# Patient Record
Sex: FEMALE | Race: ASIAN | NOT HISPANIC OR LATINO | URBAN - METROPOLITAN AREA
[De-identification: names, ages, dates, MRNs, and addresses within clinical notes are randomized per-mention and may not be internally consistent; named-entity substitution may affect disease eponyms.]

---

## 2022-09-22 ENCOUNTER — EMERGENCY (EMERGENCY)
Facility: HOSPITAL | Age: 57
LOS: 1 days | Discharge: HOME | End: 2022-09-22
Attending: EMERGENCY MEDICINE
Payer: MEDICAID

## 2022-09-22 VITALS
HEIGHT: 61 IN | OXYGEN SATURATION: 100 % | SYSTOLIC BLOOD PRESSURE: 100 MMHG | WEIGHT: 100.09 LBS | HEART RATE: 98 BPM | TEMPERATURE: 99 F | DIASTOLIC BLOOD PRESSURE: 63 MMHG | RESPIRATION RATE: 16 BRPM

## 2022-09-22 VITALS
SYSTOLIC BLOOD PRESSURE: 122 MMHG | RESPIRATION RATE: 16 BRPM | HEART RATE: 93 BPM | OXYGEN SATURATION: 100 % | TEMPERATURE: 99 F | DIASTOLIC BLOOD PRESSURE: 87 MMHG

## 2022-09-22 PROCEDURE — 99283 EMERGENCY DEPT VISIT LOW MDM: CPT

## 2022-09-22 PROCEDURE — 82962 GLUCOSE BLOOD TEST: CPT

## 2022-09-22 PROCEDURE — 99282 EMERGENCY DEPT VISIT SF MDM: CPT

## 2022-09-22 NOTE — ED PROVIDER NOTE - PATIENT PORTAL LINK FT
You can access the FollowMyHealth Patient Portal offered by [facility] by registering at the following website: [email or web address]. By joining "[employer]’s FollowMyHealth portal, you will also be able to view your health information using other applications (apps) compatible with our system.

## 2022-09-22 NOTE — ED PROVIDER NOTE - NSFOLLOWUPINSTRUCTIONS_ED_ALL_ED_FT
You presented to the emergency room today with joint pain and abdominal pain. 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 You should have close follow up with your rheumatologist and gastroenterologist about the worsening of your symptoms. If you have chest pain, shortness of breath, loss of consciousness, please return back to the emergency department.

## 2022-09-22 NOTE — ED PROVIDER NOTE - NS ED ROS FT
CONST: no fevers, no chills, no trauma  EYES: no blurry vision   ENT: no sore throat  CV: no chest pain, no extremity swelling  RESP: no shortness of breath  ABD: (+) abdominal pain, (+) nausea, (+) vomiting, no diarrhea, no melena  : no dysuria, no hematuria, no frequency  MSK: no back pain, no neck pain, no extremity pain  NEURO: no headache, no focal weakness, no dizziness  HEME: no bruising  SKIN: no rash

## 2022-09-22 NOTE — ED ADULT NURSE NOTE - OBJECTIVE STATEMENT
56 year old female pt presented to the ED co joint pain x 6 months, pt states joint pain to wrists, elbow, fingers , no deformity noted pt hamm x4 , pt is ambulatory denies any numbness or tingling, pt with equal strength in all extremities , pt followed by a rheumatologist and on medication, 56 year old female pt presented to the ED co joint pain x 6 months, pt states joint pain to wrists, elbow, fingers , no deformity noted pt hamm x4 , pt is ambulatory denies any numbness or tingling, pt with equal strength in all extremities , pt followed by a rheumatologist and on medication, pt states she has lost about 20lbs in a month with dec po nausea and vomiting, denies any fever no chills abd soft tender to right side

## 2022-09-22 NOTE — ED PROVIDER NOTE - ATTENDING CONTRIBUTION TO CARE
polyarthralgias w known rheum conditions under treatment by rheum  no redness to any joints that are uncomfortable  advise pt need to make more timely and frequent visits w specialist to treat conditions

## 2022-09-22 NOTE — ED PROVIDER NOTE - CLINICAL SUMMARY MEDICAL DECISION MAKING FREE TEXT BOX
56y Female PMhx DM, spondylarthritis, and colitis, presenting with joint pain that has been worsening of the past few month. Patient symptoms consistent with her chronic conditions. Patient has upcoming appointments with her rheumatologist and gastroenterologist on 10/6. Informed patient about calling her doctors about the possibility of moving the appointments earlier.

## 2022-09-22 NOTE — ED PROVIDER NOTE - OBJECTIVE STATEMENT
56y Female PMhx DM, spondylarthritis, and colitis, presenting with joint pain that has been worsening of the past few month. Patient states that she has aching joint pain in the bilateral MCPs, wrists, and neck. Patient was placed on sulfasalazine and prednisone 5mg by her rheumatologist 5 months ago, which limited improvement of her symptoms. Her rheumatologist has told the patient that they are trying to change medications, but need to clear it with insurance. She states that she will be seeing her rheumatologist on 10/6 for follow-up.    In addition, patient is endorsing chronic abdominal pain for the past month, with nausea/vomiting and constipation. Patient has chronic colitis. patient is followed by a gastroenterologist that she saw 5 months ago. She also has an appointment to see her gastroenterologist on 10/6. Due to her decreased PO intake, patient has been losing weight (~20 lbs in past month). Patient denies fever, CP, SOB, and urinary symptoms.

## 2022-09-22 NOTE — ED PROVIDER NOTE - PHYSICAL EXAMINATION
Const: not in acute distress  Eyes: no conjunctival injection  HEENT: Head NCAT, Moist MM.  Neck: Trachea midline.   CVS: +S1/S2, Peripheral pulses 2+ and equal in all extremities.  RESP: Unlabored respiratory effort. Clear to auscultation bilaterally.  GI: Discomfort with palpation, Nondistended, No CVA tenderness b/l.   MSK: Normocephalic/Atraumatic, No Lower Extremities edema b/l.   Skin: Intact.   Neuro: CNs II-XII grossly intact. Motor & Sensation grossly intact.  Psych: Awake, Alert, & Oriented (AAO) x3.

## 2023-01-09 PROBLEM — Z00.00 ENCOUNTER FOR PREVENTIVE HEALTH EXAMINATION: Status: ACTIVE | Noted: 2023-01-09

## 2023-01-09 PROBLEM — K52.9 NONINFECTIVE GASTROENTERITIS AND COLITIS, UNSPECIFIED: Chronic | Status: ACTIVE | Noted: 2022-09-22

## 2023-01-09 PROBLEM — M47.819 SPONDYLOSIS WITHOUT MYELOPATHY OR RADICULOPATHY, SITE UNSPECIFIED: Chronic | Status: ACTIVE | Noted: 2022-09-22

## 2023-01-13 ENCOUNTER — APPOINTMENT (OUTPATIENT)
Dept: ORTHOPEDIC SURGERY | Facility: CLINIC | Age: 58
End: 2023-01-13
Payer: MEDICAID

## 2023-01-13 VITALS
OXYGEN SATURATION: 100 % | BODY MASS INDEX: 18.88 KG/M2 | SYSTOLIC BLOOD PRESSURE: 109 MMHG | HEIGHT: 61 IN | WEIGHT: 100 LBS | DIASTOLIC BLOOD PRESSURE: 72 MMHG | HEART RATE: 101 BPM

## 2023-01-13 DIAGNOSIS — M54.12 RADICULOPATHY, CERVICAL REGION: ICD-10-CM

## 2023-01-13 PROCEDURE — 99205 OFFICE O/P NEW HI 60 MIN: CPT

## 2023-01-13 PROCEDURE — 72050 X-RAY EXAM NECK SPINE 4/5VWS: CPT

## 2023-01-13 NOTE — DISCUSSION/SUMMARY
[de-identified] : A thorough conversation was held the patient regarding her condition the natural history all treatment options risk benefits and alternatives.  Given the nature of her neck and extremity pain duration of symptoms and failure of conservative management thus far as well as weakness in the hands I recommend an MRI of the cervical spine to guide further treatment.  She will continue take Tylenol and oral NSAIDs as needed.  I also recommended a referral to my colleague in hand surgery.  She understands that it is certainly likely that there is both cervical and intrinsic hand pathology contributing to her current symptoms and I feel that both should be explored.\par \par I have spent greater than 60 minutes preparing to see the patient, collecting relevant history, performing a thorough history and physical examination, counseling the patient regarding my findings ordering the appropriate therapies and tests, communicating with other relevant healthcare professionals, documenting my encounter and coordinating care.\par

## 2023-01-13 NOTE — HISTORY OF PRESENT ILLNESS
[de-identified] : 57-year-old female presents as a new patient for evaluation of neck pain with radiation to the upper extremities.  She describes a history of acute episode neck pain several years ago which resolved after several weeks of relative rest and oral medications.  Describes gradual onset of similar symptoms over the past 3 to 4 months however they now involve radiation to the bilateral posterior shoulders and bilateral arms in the posterior lateral aspects and involving the entirety of both hands.  She does correlate the neck pain with radiating upper extremity and finger pain.  States that occasionally her fingers swell and she describes some locking of the left thumb when flexed.  Denies any deficits of finger dexterity or gait disturbance.  She has tried some oral medications as well as a directed home therapy program without relief.

## 2023-01-13 NOTE — PHYSICAL EXAM
[Normal Touch] : sensation intact for touch [Normal Proprioception] : sensation intact for proprioception [Normal] : No costovertebral angle tenderness and no spinal tenderness [de-identified] : 5 out of 5 strength throughout the bilateral upper and lower extremities with the exception of hand intrinsics 4 out of 5 in the setting of reproduced significant palmar pain with forced abduction\par Volar MCP tenderness at the left thumb without locking\par Significant trapezial spasm with mild bilateral scapular winging [de-identified] : 4 view cervical x-ray AP lateral flexion-extension views in care stream 1/13/2023:\par Lumbar lordosis is maintained with preserved motion on flexion-extension.  No significant coronal deformity.  There are degenerative changes most notably with anterior osteophyte formation at C3-4 and C6-7 there with relative maintenance of disc base height and age-appropriate spondylotic change and posterior elements of facets.

## 2023-02-01 ENCOUNTER — APPOINTMENT (OUTPATIENT)
Dept: ORTHOPEDIC SURGERY | Facility: CLINIC | Age: 58
End: 2023-02-01
Payer: MEDICAID

## 2023-02-01 VITALS — HEIGHT: 61 IN | BODY MASS INDEX: 18.5 KG/M2 | RESPIRATION RATE: 16 BRPM | WEIGHT: 98 LBS

## 2023-02-01 DIAGNOSIS — Z86.39 PERSONAL HISTORY OF OTHER ENDOCRINE, NUTRITIONAL AND METABOLIC DISEASE: ICD-10-CM

## 2023-02-01 DIAGNOSIS — Z86.2 PERSONAL HISTORY OF DISEASES OF THE BLOOD AND BLOOD-FORMING ORGANS AND CERTAIN DISORDERS INVOLVING THE IMMUNE MECHANISM: ICD-10-CM

## 2023-02-01 DIAGNOSIS — Z78.9 OTHER SPECIFIED HEALTH STATUS: ICD-10-CM

## 2023-02-01 PROCEDURE — 73110 X-RAY EXAM OF WRIST: CPT | Mod: LT,RT

## 2023-02-01 PROCEDURE — 99203 OFFICE O/P NEW LOW 30 MIN: CPT

## 2023-02-01 RX ORDER — METFORMIN HYDROCHLORIDE 625 MG/1
TABLET ORAL
Refills: 0 | Status: ACTIVE | COMMUNITY

## 2023-02-01 RX ORDER — CYCLOBENZAPRINE HCL 5 MG
TABLET ORAL
Refills: 0 | Status: ACTIVE | COMMUNITY

## 2023-02-01 RX ORDER — KETOROLAC TROMETHAMINE 10 MG
TABLET ORAL
Refills: 0 | Status: ACTIVE | COMMUNITY

## 2023-03-15 NOTE — ASSESSMENT
[FreeTextEntry1] : 57-year-old female presents with chronic neck pain bilateral upper extremity pain secondary to cervical radicular radiculopathy versus trapezial scapulothoracic myofascial dysfunction, symptomatic arthritis of the bilateral hands and left trigger thumb Topical Clindamycin Counseling: Patient counseled that this medication may cause skin irritation or allergic reactions.  In the event of skin irritation, the patient was advised to reduce the amount of the drug applied or use it less frequently.   The patient verbalized understanding of the proper use and possible adverse effects of clindamycin.  All of the patient's questions and concerns were addressed.

## 2023-03-23 ENCOUNTER — APPOINTMENT (OUTPATIENT)
Dept: ORTHOPEDIC SURGERY | Facility: CLINIC | Age: 58
End: 2023-03-23

## 2023-03-24 ENCOUNTER — APPOINTMENT (OUTPATIENT)
Dept: ORTHOPEDIC SURGERY | Facility: CLINIC | Age: 58
End: 2023-03-24

## 2023-04-05 ENCOUNTER — APPOINTMENT (OUTPATIENT)
Dept: ORTHOPEDIC SURGERY | Facility: CLINIC | Age: 58
End: 2023-04-05
Payer: MEDICAID

## 2023-04-05 DIAGNOSIS — M65.319 TRIGGER THUMB, UNSPECIFIED THUMB: ICD-10-CM

## 2023-04-05 PROCEDURE — 99213 OFFICE O/P EST LOW 20 MIN: CPT | Mod: 25

## 2023-04-05 PROCEDURE — 20550 NJX 1 TENDON SHEATH/LIGAMENT: CPT | Mod: 50

## 2023-04-19 ENCOUNTER — APPOINTMENT (OUTPATIENT)
Dept: ORTHOPEDIC SURGERY | Facility: CLINIC | Age: 58
End: 2023-04-19

## 2023-06-02 ENCOUNTER — NON-APPOINTMENT (OUTPATIENT)
Age: 58
End: 2023-06-02

## 2023-06-03 ENCOUNTER — NON-APPOINTMENT (OUTPATIENT)
Age: 58
End: 2023-06-03

## 2023-06-05 ENCOUNTER — APPOINTMENT (OUTPATIENT)
Dept: RHEUMATOLOGY | Facility: CLINIC | Age: 58
End: 2023-06-05
Payer: MEDICAID

## 2023-06-05 ENCOUNTER — LABORATORY RESULT (OUTPATIENT)
Age: 58
End: 2023-06-05

## 2023-06-05 ENCOUNTER — NON-APPOINTMENT (OUTPATIENT)
Age: 58
End: 2023-06-05

## 2023-06-05 VITALS
WEIGHT: 98 LBS | SYSTOLIC BLOOD PRESSURE: 107 MMHG | OXYGEN SATURATION: 100 % | HEIGHT: 61 IN | HEART RATE: 109 BPM | BODY MASS INDEX: 18.5 KG/M2 | DIASTOLIC BLOOD PRESSURE: 74 MMHG | TEMPERATURE: 98.7 F

## 2023-06-05 DIAGNOSIS — Z86.2 PERSONAL HISTORY OF DISEASES OF THE BLOOD AND BLOOD-FORMING ORGANS AND CERTAIN DISORDERS INVOLVING THE IMMUNE MECHANISM: ICD-10-CM

## 2023-06-05 PROCEDURE — 99204 OFFICE O/P NEW MOD 45 MIN: CPT | Mod: 25

## 2023-06-05 RX ORDER — IBUPROFEN 600 MG/1
600 TABLET, FILM COATED ORAL
Qty: 21 | Refills: 0 | Status: COMPLETED | COMMUNITY
Start: 2023-05-07

## 2023-06-06 LAB
CCP AB SER IA-ACNC: <8 UNITS
HLA-B27 QL NAA+PROBE: NORMAL
RF+CCP IGG SER-IMP: NEGATIVE
RHEUMATOID FACT SER QL: <10 IU/ML

## 2023-06-07 LAB
ALBUMIN SERPL ELPH-MCNC: 3.9 G/DL
ALP BLD-CCNC: 154 U/L
ALT SERPL-CCNC: 17 U/L
ANA PAT FLD IF-IMP: NORMAL
ANACR T: ABNORMAL
ANION GAP SERPL CALC-SCNC: 10 MMOL/L
AST SERPL-CCNC: 24 U/L
BILIRUB SERPL-MCNC: 0.3 MG/DL
BUN SERPL-MCNC: 11 MG/DL
CALCIUM SERPL-MCNC: 9.9 MG/DL
CHLORIDE SERPL-SCNC: 101 MMOL/L
CO2 SERPL-SCNC: 25 MMOL/L
CREAT SERPL-MCNC: 0.49 MG/DL
CRP SERPL-MCNC: 12 MG/L
EGFR: 111 ML/MIN/1.73M2
ERYTHROCYTE [SEDIMENTATION RATE] IN BLOOD BY WESTERGREN METHOD: 104 MM/HR
GLUCOSE SERPL-MCNC: 103 MG/DL
HBV CORE IGG+IGM SER QL: NONREACTIVE
HBV SURFACE AB SER QL: NONREACTIVE
HBV SURFACE AG SER QL: NONREACTIVE
HCV AB SER QL: NONREACTIVE
HCV S/CO RATIO: 0.27 S/CO
POTASSIUM SERPL-SCNC: 4 MMOL/L
PROT SERPL-MCNC: 7.8 G/DL
SODIUM SERPL-SCNC: 137 MMOL/L

## 2023-06-08 RX ORDER — MESALAMINE 0.38 G/1
0.38 CAPSULE, EXTENDED RELEASE ORAL
Refills: 0 | Status: DISCONTINUED | COMMUNITY
End: 2023-06-08

## 2023-06-08 RX ORDER — MESALAMINE 1.2 G/1
1.2 TABLET, DELAYED RELEASE ORAL
Refills: 0 | Status: ACTIVE | COMMUNITY

## 2023-06-08 NOTE — DATA REVIEWED
[FreeTextEntry1] : Labs scanned into chart\par \par SEGUN positive\par CBC notable for hemoglobin 10.3\par Platelets 323\par WBC count 4.1\par C3 and C4 normal\par Rheumatoid factor negative\par dsDNA 2\par SEGUN positive\par \par

## 2023-06-08 NOTE — PHYSICAL EXAM
[Abnormal Walk] : normal gait [Skin Lesions] : no skin lesions [Oriented To Time, Place, And Person] : oriented to person, place, and time [Impaired Insight] : insight and judgment were intact [Affect] : the affect was normal [Mood] : the mood was normal [General Appearance - Alert] : alert [General Appearance - In No Acute Distress] : in no acute distress [General Appearance - Well Developed] : well developed [General Appearance - Well Nourished] : well nourished [Sclera] : the sclera and conjunctiva were normal [] : no respiratory distress [Exaggerated Use Of Accessory Muscles For Inspiration] : no accessory muscle use [FreeTextEntry1] : bilateral 2nd-4th MCP edema, decreased active ROM of the bilateral shoulders due to pain, decreased lateral cervical rom, good flexion and extensuon, fdecreased extrsnion of the bialteral wrists,.]

## 2023-06-08 NOTE — ASSESSMENT
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  Patient with one year history of arthralgias associated with swelling of the joints and morning stiffness.  At this time, given inflammatory arthritis, SEGUN positive history of ITP, will obtain further testing today to evaluate for an underlying connective tissue disease such as systemic lupus.  We will check SEGUN with reflex to serologies, will repeat rheumatoid factor and anti-CCP, will repeat ESR and CRP, as well as CBC and chemistries.  Given history of ulcerative colitis and inflammatory arthritis, will also check HLA-B27 today in office.  Further management pending results, follow-up in 2 weeks or sooner as needed.\par \par Addendum:\par Spoke with patient, reviewed lab results with patient, will start medrol 8 mg qday, follow up 6/20 pending.

## 2023-06-08 NOTE — REVIEW OF SYSTEMS
[Feeling Poorly] : feeling poorly [Arthralgias] : arthralgias [Joint Pain] : joint pain [Joint Swelling] : joint swelling [Joint Stiffness] : joint stiffness [Negative] : Heme/Lymph

## 2023-06-08 NOTE — HISTORY OF PRESENT ILLNESS
[FreeTextEntry1] : Patient referred by PMD for rheumatology evaluation\par \par For the past year felt pain\par Started with hands, swollen fingers, joint pain\par Feels getting worse, worse in the morning, feels stiff\par Knee feel pain\par +fatigue, decreased appetite, weight loss\par Has not been feeling well\par Pain is severe, can't do simple things, like take a shower, house chores, getting dressed\par \par Taking  pain medications\par Also with history of ulcerative colitis\par GI: [place], Sophia Cu[facility], [facility]\par No biologics in past\par \par No family history of arthritis\par +family history of colitis, sister with Crohns, brother with UC\par \par Last colonoscopy a couple of years\par Took prednisone for ITP, history of ITP in 2015\par Rituximab x 2 doses, about two years ago\par hematologist: Dr. Crum\par Recently evaluated for trigger finger by hand orthopedist\par ITP\par Colitis\par Diabetes (metformin) 1000 bid\par

## 2023-06-09 ENCOUNTER — TRANSCRIPTION ENCOUNTER (OUTPATIENT)
Age: 58
End: 2023-06-09

## 2023-06-12 ENCOUNTER — TRANSCRIPTION ENCOUNTER (OUTPATIENT)
Age: 58
End: 2023-06-12

## 2023-06-15 ENCOUNTER — TRANSCRIPTION ENCOUNTER (OUTPATIENT)
Age: 58
End: 2023-06-15

## 2023-06-20 ENCOUNTER — NON-APPOINTMENT (OUTPATIENT)
Age: 58
End: 2023-06-20

## 2023-06-20 ENCOUNTER — APPOINTMENT (OUTPATIENT)
Dept: RHEUMATOLOGY | Facility: CLINIC | Age: 58
End: 2023-06-20
Payer: MEDICAID

## 2023-06-20 VITALS
BODY MASS INDEX: 18.31 KG/M2 | WEIGHT: 97 LBS | DIASTOLIC BLOOD PRESSURE: 77 MMHG | TEMPERATURE: 98.2 F | SYSTOLIC BLOOD PRESSURE: 121 MMHG | HEIGHT: 61 IN | OXYGEN SATURATION: 100 % | HEART RATE: 108 BPM

## 2023-06-20 DIAGNOSIS — M79.642 PAIN IN RIGHT HAND: ICD-10-CM

## 2023-06-20 DIAGNOSIS — K51.90 ULCERATIVE COLITIS, UNSPECIFIED, W/OUT COMPLICATIONS: ICD-10-CM

## 2023-06-20 DIAGNOSIS — R70.0 ELEVATED ERYTHROCYTE SEDIMENTATION RATE: ICD-10-CM

## 2023-06-20 DIAGNOSIS — M79.641 PAIN IN RIGHT HAND: ICD-10-CM

## 2023-06-20 DIAGNOSIS — R76.8 OTHER SPECIFIED ABNORMAL IMMUNOLOGICAL FINDINGS IN SERUM: ICD-10-CM

## 2023-06-20 PROCEDURE — 99214 OFFICE O/P EST MOD 30 MIN: CPT

## 2023-06-21 NOTE — HISTORY OF PRESENT ILLNESS
[FreeTextEntry1] : Patient referred by PMD for rheumatology evaluation\par \par For the past year felt pain\par Started with hands, swollen fingers, joint pain\par Feels getting worse, worse in the morning, feels stiff\par Knee feel pain\par +fatigue, decreased appetite, weight loss\par Has not been feeling well\par Pain is severe, can't do simple things, like take a shower, house chores, getting dressed\par \par Taking  pain medications\par Also with history of ulcerative colitis\par GI: [place], Sophia Cuello, [place]\par No biologics in past\par \par No family history of arthritis\par +family history of colitis, sister with Crohns, brother with UC\par \par Last colonoscopy a couple of years\par Took prednisone for ITP, history of ITP in 2015\par Rituximab x 2 doses, about two years ago\par hematologist: Dr. Crum\par Recently evaluated for trigger finger by hand orthopedist\par ITP\par Colitis\par Diabetes (metformin) 1000 bid\par \par June 20, 2023\par Patient returns for follow up \par Patient started on medrol 8 mg daily, feels great improvement in terms of joint pain and stiffness\par Reviewed labs with patient\par SEGUN+\par DsDNA+\par chromatin 3.3\par KATHERINE neg\par History of ITP\par inflammatory arthritis\par SEGUN+, DsDNA +, leukopenia\par \par CRP 12\par Reviewed labs from hematologist as well, ESR 54 on June 6\par RF and CCP neg\par \par \par

## 2023-06-21 NOTE — ASSESSMENT
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  Patient with one year history of arthralgias associated with swelling of the joints and morning stiffness, patient started on medrol 8 mg qday with great improvement in symptoms after two to three doses.  Reviewed lab results with patient, SEGUN+, DsDNA+, history of inflammatory arthritis, previous history of ITP, and leukopenia, consistent with criteria for systemic lupus. WIll start hydroxychloroquine 200 mg qday increasing to bid dosing as tolerated, reviewed risks and benefits, and recommendation for ophthalmology monitoring for retinopathy.  Will start to taper medrol to 4 mg qday with goal to discontinue.  Will follow up in six weeks or sooner as needed. Patient will see hematologist this week as well. \par

## 2023-06-21 NOTE — PHYSICAL EXAM
[General Appearance - Alert] : alert [General Appearance - In No Acute Distress] : in no acute distress [General Appearance - Well-Appearing] : healthy appearing [Sclera] : the sclera and conjunctiva were normal [Respiration, Rhythm And Depth] : normal respiratory rhythm and effort [Exaggerated Use Of Accessory Muscles For Inspiration] : no accessory muscle use [Abnormal Walk] : normal gait [Musculoskeletal - Swelling] : no joint swelling seen [] : no rash [Oriented To Time, Place, And Person] : oriented to person, place, and time [Impaired Insight] : insight and judgment were intact [Affect] : the affect was normal [Mood] : the mood was normal [FreeTextEntry1] : decreased edema of the hands bilaterally, good handgrip strength, good ROM of the bialteral shoudlers with improvement in ROM of the cervical spine. Normal gait

## 2023-07-14 RX ORDER — METHYLPREDNISOLONE 8 MG/1
8 TABLET ORAL
Qty: 30 | Refills: 0 | Status: ACTIVE | COMMUNITY
Start: 2023-06-08 | End: 1900-01-01

## 2023-08-03 ENCOUNTER — APPOINTMENT (OUTPATIENT)
Dept: RHEUMATOLOGY | Facility: CLINIC | Age: 58
End: 2023-08-03

## 2023-09-06 ENCOUNTER — APPOINTMENT (OUTPATIENT)
Dept: NEUROLOGY | Facility: CLINIC | Age: 58
End: 2023-09-06
Payer: MEDICAID

## 2023-09-06 VITALS
BODY MASS INDEX: 18.5 KG/M2 | WEIGHT: 98 LBS | HEIGHT: 61 IN | HEART RATE: 118 BPM | TEMPERATURE: 98.1 F | SYSTOLIC BLOOD PRESSURE: 114 MMHG | OXYGEN SATURATION: 100 % | DIASTOLIC BLOOD PRESSURE: 72 MMHG

## 2023-09-06 DIAGNOSIS — G44.209 TENSION-TYPE HEADACHE, UNSPECIFIED, NOT INTRACTABLE: ICD-10-CM

## 2023-09-06 DIAGNOSIS — G57.93 UNSPECIFIED MONONEUROPATHY OF BILATERAL LOWER LIMBS: ICD-10-CM

## 2023-09-06 PROCEDURE — 99204 OFFICE O/P NEW MOD 45 MIN: CPT

## 2023-09-06 NOTE — PHYSICAL EXAM
[FreeTextEntry1] :  CN: PERRL, EOMI, visual fields full  Motor: normal bulk and tone, 5/5 strength throughout, no abnormal movements  Sensory: vibration, cold sensation, JPS intact and symmetric in LE b/l   Reflexes: 2+ symmetric throughout, no Hoffmans sign, plantar responses flexor bilaterally  Coordination: no dysmetria on finger to nose, Romberg negative  Gait: normal, can tandem without difficult

## 2023-09-06 NOTE — ASSESSMENT
[FreeTextEntry1] : Symptoms are suggestive of neuropathic pain, although NCS/EMG was normal. It may be a small fiber neuropathy although temperature sensation is intact. She does have risk factors for small fiber neuropathy - DM2 and lupus We discussed trying a neuropathic pain medication however she does not want to add another medication at this time. She will reach out to podiatrist to get another prescription for the topical compound cream that helped her in the past.  Refer to PT for tension headaches likely stemming from neck muscle tension Return as needed

## 2023-09-06 NOTE — HISTORY OF PRESENT ILLNESS
[FreeTextEntry1] : Referred by Dr. Cele Still for pain in her feet, described as "electric shocks", worse at night / at rest, present for over a year. She denies numbness or change in temperature sensation. Sometimes has tingling in hands and feet. She endorses imbalance, dizziness/lightheadedness.  She saw a neurologist at [facility] who thought symptoms were functional in nature.  She was prescribed a topical compound medication that helped somewhat.  She also complains of headaches, bilateral, temporal, feels like pressure, associated with neck tension   She has a history of ITP/essential thrombocythemia, DM2, ulcerative colitis, lupus (on plaquenil)  Reviewed: NCS/EMG of LE - normal Notes from rheumatology Labs below

## 2023-09-06 NOTE — CONSULT LETTER
[Dear  ___] : Dear  [unfilled], [Consult Letter:] : I had the pleasure of evaluating your patient, [unfilled]. [Please see my note below.] : Please see my note below. [Consult Closing:] : Thank you very much for allowing me to participate in the care of this patient.  If you have any questions, please do not hesitate to contact me. [Sincerely,] : Sincerely, [FreeTextEntry3] : Eliud Hollingsworth M.D. Neurology, Electromyography and Neuromuscular Medicine [facility]   of Neurology [facility]

## 2024-02-18 ENCOUNTER — RX RENEWAL (OUTPATIENT)
Age: 59
End: 2024-02-18

## 2024-02-18 RX ORDER — HYDROXYCHLOROQUINE SULFATE 200 MG/1
200 TABLET, FILM COATED ORAL TWICE DAILY
Qty: 60 | Refills: 1 | Status: ACTIVE | COMMUNITY
Start: 2023-06-20 | End: 1900-01-01